# Patient Record
(demographics unavailable — no encounter records)

---

## 2025-01-17 NOTE — ASSESSMENT
[FreeTextEntry1] : abdominal pain, chronic intermittent severe episodes with recent worsening requiring hospitalization follow up imaging with persistent SB abnormalities. -10 cm loop of left mid small bowel with persistent but improved upstream bowel dilatation.?Crohn's disease reviewed diagnostic evaluation including enteroscopy, or VCE or not testing. Due to concern for capsule retention will recommend  enteroscopy.  -referred to Dr. French  -labs /stool studies ordered. May require biologic therapy -continue LF diet -patient counseled to go to ER if worsening pain or other concerns.

## 2025-01-17 NOTE — HISTORY OF PRESENT ILLNESS
[FreeTextEntry1] : 58 yo f PMH skin ca s/p MOHS, ventral hernia repair, saw NP  for bloating/distentionx 2 years.   active lifestyle, balanced diet. daily bm colonoscopy for anemia at age 49 normal.   EGD/Colonoscopy 12/2024- path benign- hyperplastic polyp gastric cardiac- recall EGD in 1 y colon polyp hyperplastic - recall in 3 y  saw surgeon at OSH for abd pain , US reportedly showed sludge, HIDA equivocal.  due to continued attacks for bloating/distention CT A/P ordered, trial of H2b  in the meantime was admitted to [facility] 12/23 CT performed in the hospital showed inflammatio in the small intestine, MRE to be scheduled when improved.   CT A.P 12/23/24 with IVC only: IMPRESSION:  Focally dilated jejunal fold in the left abdomen containing fecal material, concerning for at least partial small bowel obstruction. Transition point identified in the left lower quadrant with diffuse mural wall thickening/hyperemia of distal jejunal loop, enteric infectious or inflammatory process considered.  Surgical consultation and follow-up imaging is suggested.  patient seen by surgery in the hospital - improved clinically.  Patient tolerated yogurt and was discharged. continued to have symptoms while traveling for the holidays. remained on liquid diet and PPI.    she remained symptom free since 12/29 and put back on some weight.   MRE performed 1/13/25: WEL: Persistent but improved circumferential inflammation involving a 10 cm loop of left mid small bowel. There is mesenteric hyperemia. Associated luminal narrowing of the affected bowel loops with persistent mild upstream bowel dilatation, also improved. No additional sites of inflammation in the small or large bowel.  IMPRESSION:  *  Persistent but improved active inflammation involving a 10 cm loop of left mid small bowel with persistent but improved upstream bowel dilatation.  *  No additional sites of inflammation in the small or large bowel.  she has gained a couple of pounds, now 95 lbs, her baseline in 100 bs she is still on low fiber diet she is pain free since 12/29 bm qod. no brbpr/melena    pat gm- CRC dx in her late 60s no other relatives with colon poyps, colon cancer no IBD , no autoimune disease

## 2025-01-17 NOTE — HISTORY OF PRESENT ILLNESS
[FreeTextEntry1] : 60 yo f PMH skin ca s/p MOHS, ventral hernia repair, saw NP  for bloating/distentionx 2 years.   active lifestyle, balanced diet. daily bm colonoscopy for anemia at age 49 normal.   EGD/Colonoscopy 12/2024- path benign- hyperplastic polyp gastric cardiac- recall EGD in 1 y colon polyp hyperplastic - recall in 3 y  saw surgeon at OSH for abd pain , US reportedly showed sludge, HIDA equivocal.  due to continued attacks for bloating/distention CT A/P ordered, trial of H2b  in the meantime was admitted to [facility] 12/23 CT performed in the hospital showed inflammatio in the small intestine, MRE to be scheduled when improved.   CT A.P 12/23/24 with IVC only: IMPRESSION:  Focally dilated jejunal fold in the left abdomen containing fecal material, concerning for at least partial small bowel obstruction. Transition point identified in the left lower quadrant with diffuse mural wall thickening/hyperemia of distal jejunal loop, enteric infectious or inflammatory process considered.  Surgical consultation and follow-up imaging is suggested.  patient seen by surgery in the hospital - improved clinically.  Patient tolerated yogurt and was discharged. continued to have symptoms while traveling for the holidays. remained on liquid diet and PPI.    she remained symptom free since 12/29 and put back on some weight.   MRE performed 1/13/25: WEL: Persistent but improved circumferential inflammation involving a 10 cm loop of left mid small bowel. There is mesenteric hyperemia. Associated luminal narrowing of the affected bowel loops with persistent mild upstream bowel dilatation, also improved. No additional sites of inflammation in the small or large bowel.  IMPRESSION:  *  Persistent but improved active inflammation involving a 10 cm loop of left mid small bowel with persistent but improved upstream bowel dilatation.  *  No additional sites of inflammation in the small or large bowel.  she has gained a couple of pounds, now 95 lbs, her baseline in 100 bs she is still on low fiber diet she is pain free since 12/29 bm qod. no brbpr/melena    pat gm- CRC dx in her late 60s no other relatives with colon poyps, colon cancer no IBD , no autoimune disease

## 2025-02-15 NOTE — HISTORY OF PRESENT ILLNESS
[FreeTextEntry1] : 60 yo f PMH skin ca s/p MOHS, ventral hernia repair, presents for follow up of enteritis.  had recent enteroscopy with Dr. French: Reviewed results of small bowel enteroscopy biopsies confirming "focal severe chronic active inflammation and reactive changes." No ulcers, masses, lesions seen - although mucosa appeared inflamed in a patchy pattern. No strictures seen.  She is having more abd pain this week, not as severe as prior. Still able to tolerate PO. weight is stable. bm normal.  she has gotten shingrix vaccine. she is up to date with other vaccinations.  [place] did not live abroad.  no Tb exposures. no EIM of IBD denies NSAID use.    last seen 1/15/25 active lifestyle, balanced diet. daily bm colonoscopy for anemia at age 49 normal.   EGD/Colonoscopy 12/2024- path benign- hyperplastic polyp gastric cardiac- recall EGD in 1 y colon polyp hyperplastic - recall in 3 y  saw surgeon at OSH for abd pain , US reportedly showed sludge, HIDA equivocal.  due to continued attacks for bloating/distention CT A/P ordered, trial of H2b  in the meantime was admitted to [facility] 12/23 CT performed in the hospital showed inflammatio in the small intestine, MRE to be scheduled when improved.   CT A.P 12/23/24 with IVC only: IMPRESSION:  Focally dilated jejunal fold in the left abdomen containing fecal material, concerning for at least partial small bowel obstruction. Transition point identified in the left lower quadrant with diffuse mural wall thickening/hyperemia of distal jejunal loop, enteric infectious or inflammatory process considered.  Surgical consultation and follow-up imaging is suggested.  patient seen by surgery in the hospital - improved clinically.  Patient tolerated yogurt and was discharged. continued to have symptoms while traveling for the holidays. remained on liquid diet and PPI.    she remained symptom free since 12/29 and put back on some weight.   MRE performed 1/13/25: WEL: Persistent but improved circumferential inflammation involving a 10 cm loop of left mid small bowel. There is mesenteric hyperemia. Associated luminal narrowing of the affected bowel loops with persistent mild upstream bowel dilatation, also improved. No additional sites of inflammation in the small or large bowel.  IMPRESSION:  *  Persistent but improved active inflammation involving a 10 cm loop of left mid small bowel with persistent but improved upstream bowel dilatation.  *  No additional sites of inflammation in the small or large bowel.  she has gained a couple of pounds, now 95 lbs, her baseline in 100 bs she is still on low fiber diet she is pain free since 12/29 bm qod. no brbpr/melena    pat gm- CRC dx in her late 60s no other relatives with colon polyps, colon cancer no IBD , no autoimmune disease

## 2025-02-15 NOTE — HISTORY OF PRESENT ILLNESS
[FreeTextEntry1] : 60 yo f PMH skin ca s/p MOHS, ventral hernia repair, presents for follow up of enteritis.  had recent enteroscopy with Dr. French: Reviewed results of small bowel enteroscopy biopsies confirming "focal severe chronic active inflammation and reactive changes." No ulcers, masses, lesions seen - although mucosa appeared inflamed in a patchy pattern. No strictures seen.  She is having more abd pain this week, not as severe as prior. Still able to tolerate PO. weight is stable. bm normal.  she has gotten shingrix vaccine. she is up to date with other vaccinations.  [place] did not live abroad.  no Tb exposures. no EIM of IBD denies NSAID use.    last seen 1/15/25 active lifestyle, balanced diet. daily bm colonoscopy for anemia at age 49 normal.   EGD/Colonoscopy 12/2024- path benign- hyperplastic polyp gastric cardiac- recall EGD in 1 y colon polyp hyperplastic - recall in 3 y  saw surgeon at OSH for abd pain , US reportedly showed sludge, HIDA equivocal.  due to continued attacks for bloating/distention CT A/P ordered, trial of H2b  in the meantime was admitted to [facility] 12/23 CT performed in the hospital showed inflammatio in the small intestine, MRE to be scheduled when improved.   CT A.P 12/23/24 with IVC only: IMPRESSION:  Focally dilated jejunal fold in the left abdomen containing fecal material, concerning for at least partial small bowel obstruction. Transition point identified in the left lower quadrant with diffuse mural wall thickening/hyperemia of distal jejunal loop, enteric infectious or inflammatory process considered.  Surgical consultation and follow-up imaging is suggested.  patient seen by surgery in the hospital - improved clinically.  Patient tolerated yogurt and was discharged. continued to have symptoms while traveling for the holidays. remained on liquid diet and PPI.    she remained symptom free since 12/29 and put back on some weight.   MRE performed 1/13/25: WEL: Persistent but improved circumferential inflammation involving a 10 cm loop of left mid small bowel. There is mesenteric hyperemia. Associated luminal narrowing of the affected bowel loops with persistent mild upstream bowel dilatation, also improved. No additional sites of inflammation in the small or large bowel.  IMPRESSION:  *  Persistent but improved active inflammation involving a 10 cm loop of left mid small bowel with persistent but improved upstream bowel dilatation.  *  No additional sites of inflammation in the small or large bowel.  she has gained a couple of pounds, now 95 lbs, her baseline in 100 bs she is still on low fiber diet she is pain free since 12/29 bm qod. no brbpr/melena    pat gm- CRC dx in her late 60s no other relatives with colon polyps, colon cancer no IBD , no autoimmune disease

## 2025-02-15 NOTE — ASSESSMENT
[FreeTextEntry1] : abdominal pain, chronic intermittent severe episodes with recent worsening requiring hospitalization due to partial SBO follow up imaging with persistent SB abnormalities. -10 cm loop of left mid small bowel with persistent but improved upstream bowel dilatation. recent enteroscopy consistent with Crohn's disease. stool studies negative for infection. patient denies NSAID use.  counseled patient on features of Crohn's disease, diagnosis, natural history, treatment options, monitoring and prognosis due to evidence of severe disease recommend biologic therapy.  patient is treatment naive and based on currently available effectiveness and safety data recommend rizankizumab  -reviewed risks of treatment including but not limited to infusion/injection reaction, increased risk of infection, drug induced liver injury and antibody development discussed alternative treatments with ustekinumab and vedolizumab,  -patient is up to date with vaccines. up to date with cancer screening-MMG, pap, colonoscopy. she sees dermatology yearly.  follows with PCP for DEXA.  -continue LF diet -discussed the at risk of uncontrolled disease is greater than risk of treatment.  -patient understands and agrees to treatment with Rizankizumab -will start PA process with insurance and notify patient when ready. Will try to avoid steroids if possible.  -patient instructed to go to ER if worsening pain, inability to tolerate PO or other concerns. as may require IV steroids

## 2025-07-13 NOTE — ASSESSMENT
[FreeTextEntry1] : SB Crohns diseasedx 02/2025, hostpialized for pSBO, on Skyrizi,  worsening symptoms since starting Riza injections.  check labs, calpro patient counseled to go to ER if worsening pain or other concerns.  plan for imaging at 6 months of Riza or sooner if needed.   rectal bleeding, likely int hemorrhoids, rectal exam today- normal external exam, normal TANIYA. hard stoo in vaulyt. brown stool. Chaperoned by Abhishek Choi MA -check for Crohn's disease -call if severe bleeding  EGD for gastric polyp due dec 2025  f/u in 08/2025

## 2025-07-13 NOTE — HISTORY OF PRESENT ILLNESS
[FreeTextEntry1] : 60 yo f PMH skin ca s/p MOHS, ventral hernia repair, presents for follow up of SB Crohns disease, on Skyrizi.   bright pink blood on tp last weekend, ?blood on the stool.  occurred one other time this week.  she has been having mild periumbilical pain , typical of her Crohns disease hd 1 st Skyrizi injection June 4. the following week she started to have intermittent discomfort.  most consistent in last 2 weeks.  eating all kinds of food. limiting vegetables. has small salads.  weight is stable.  her baseline pattern for past 2 years- is small stools on daily doses, then larger bm every few days.    prior hx Was on Prednisone,  felt better in 24 hours after starting prednisone. started Skyrizi, March 2025 just finished last wek of Prednisone.  3rd skyrizi due next week.  she stopped her allergy medications.   eating more.  now up to 99 lbs (previously 90 lbs) . baseline is 102 lbs.   prior hx had recent enteroscopy with Dr. French: Reviewed results of small bowel enteroscopy biopsies confirming "focal severe chronic active inflammation and reactive changes." No ulcers, masses, lesions seen - although mucosa appeared inflamed in a patchy pattern. No strictures seen.  She is having more abd pain this week, not as severe as prior. Still able to tolerate PO. weight is stable. bm normal.  she has gotten shingrix vaccine. she is up to date with other vaccinations.  [place] did not live [place].  no Tb exposures. no EIM of IBD denies NSAID use.    last seen 1/15/25 active lifestyle, balanced diet. daily bm colonoscopy for anemia at age 49 normal.   EGD/Colonoscopy 12/2024- path benign- hyperplastic polyp gastric cardiac- recall EGD in 1 y colon polyp hyperplastic - recall in 3 y  saw surgeon at OSH for abd pain , US reportedly showed sludge, HIDA equivocal.  due to continued attacks for bloating/distention CT A/P ordered, trial of H2b  in the meantime was admitted to [facility] 12/23 CT performed in the hospital showed inflammatio in the small intestine, MRE to be scheduled when improved.   CT A.P 12/23/24 with IVC only: IMPRESSION:  Focally dilated jejunal fold in the left abdomen containing fecal material, concerning for at least partial small bowel obstruction. Transition point identified in the left lower quadrant with diffuse mural wall thickening/hyperemia of distal jejunal loop, enteric infectious or inflammatory process considered.  Surgical consultation and follow-up imaging is suggested.  patient seen by surgery in the hospital - improved clinically.  Patient tolerated yogurt and was discharged. continued to have symptoms while traveling for the holidays. remained on liquid diet and PPI.    she remained symptom free since 12/29 and put back on some weight.   MRE performed 1/13/25: WEL: Persistent but improved circumferential inflammation involving a 10 cm loop of left mid small bowel. There is mesenteric hyperemia. Associated luminal narrowing of the affected bowel loops with persistent mild upstream bowel dilatation, also improved. No additional sites of inflammation in the small or large bowel.  IMPRESSION:  *  Persistent but improved active inflammation involving a 10 cm loop of left mid small bowel with persistent but improved upstream bowel dilatation.  *  No additional sites of inflammation in the small or large bowel.  she has gained a couple of pounds, now 95 lbs, her baseline in 100 bs she is still on low fiber diet she is pain free since 12/29 bm qod. no brbpr/melena    pat gm- CRC dx in her late 60s no other relatives with colon polyps, colon cancer no IBD , no autoimmune disease

## 2025-07-13 NOTE — ADDENDUM
[FreeTextEntry1] : spoke with patient - Stool test +for EPEC- will treat with Cipro x 3 days. F/u in 1 week.

## 2025-07-13 NOTE — HISTORY OF PRESENT ILLNESS
[FreeTextEntry1] : 60 yo f PMH skin ca s/p MOHS, ventral hernia repair, presents for follow up of SB Crohns disease, on Skyrizi.   bright pink blood on tp last weekend, ?blood on the stool.  occurred one other time this week.  she has been having mild periumbilical pain , typical of her Crohns disease hd 1 st Skyrizi injection June 4. the following week she started to have intermittent discomfort.  most consistent in last 2 weeks.  eating all kinds of food. limiting vegetables. has small salads.  weight is stable.  her baseline pattern for past 2 years- is small stools on daily doses, then larger bm every few days.    prior hx Was on Prednisone,  felt better in 24 hours after starting prednisone. started Skyrizi, March 2025 just finished last wek of Prednisone.  3rd skyrizi due next week.  she stopped her allergy medications.   eating more.  now up to 99 lbs (previously 90 lbs) . baseline is 102 lbs.   prior hx had recent enteroscopy with Dr. French: Reviewed results of small bowel enteroscopy biopsies confirming "focal severe chronic active inflammation and reactive changes." No ulcers, masses, lesions seen - although mucosa appeared inflamed in a patchy pattern. No strictures seen.  She is having more abd pain this week, not as severe as prior. Still able to tolerate PO. weight is stable. bm normal.  she has gotten shingrix vaccine. she is up to date with other vaccinations.  [place].  no Tb exposures. no EIM of IBD denies NSAID use.    last seen 1/15/25 active lifestyle, balanced diet. daily bm colonoscopy for anemia at age 49 normal.   EGD/Colonoscopy 12/2024- path benign- hyperplastic polyp gastric cardiac- recall EGD in 1 y colon polyp hyperplastic - recall in 3 y  saw surgeon at OSH for abd pain , US reportedly showed sludge, HIDA equivocal.  due to continued attacks for bloating/distention CT A/P ordered, trial of H2b  in the meantime was admitted to [facility] 12/23 CT performed in the hospital showed inflammatio in the small intestine, MRE to be scheduled when improved.   CT A.P 12/23/24 with IVC only: IMPRESSION:  Focally dilated jejunal fold in the left abdomen containing fecal material, concerning for at least partial small bowel obstruction. Transition point identified in the left lower quadrant with diffuse mural wall thickening/hyperemia of distal jejunal loop, enteric infectious or inflammatory process considered.  Surgical consultation and follow-up imaging is suggested.  patient seen by surgery in the hospital - improved clinically.  Patient tolerated yogurt and was discharged. continued to have symptoms while traveling for the holidays. remained on liquid diet and PPI.    she remained symptom free since 12/29 and put back on some weight.   MRE performed 1/13/25: WEL: Persistent but improved circumferential inflammation involving a 10 cm loop of left mid small bowel. There is mesenteric hyperemia. Associated luminal narrowing of the affected bowel loops with persistent mild upstream bowel dilatation, also improved. No additional sites of inflammation in the small or large bowel.  IMPRESSION:  *  Persistent but improved active inflammation involving a 10 cm loop of left mid small bowel with persistent but improved upstream bowel dilatation.  *  No additional sites of inflammation in the small or large bowel.  she has gained a couple of pounds, now 95 lbs, her baseline in 100 bs she is still on low fiber diet she is pain free since 12/29 bm qod. no brbpr/melena    pat gm- CRC dx in her late 60s no other relatives with colon polyps, colon cancer no IBD , no autoimmune disease

## 2025-07-16 NOTE — PHYSICAL EXAM
[Appropriately responsive] : appropriately responsive [Alert] : alert [No Acute Distress] : no acute distress [No Lymphadenopathy] : no lymphadenopathy [Soft] : soft [Non-tender] : non-tender [Non-distended] : non-distended [No HSM] : No HSM [No Mass] : no mass [Oriented x3] : oriented x3 [Examination Of The Breasts] : a normal appearance [No Discharge] : no discharge [No Masses] : no breast masses were palpable [No Lesions] : no lesions  [Labia Majora] : normal [Labia Minora] : normal [Atrophy] : atrophy [No Bleeding] : There was no active vaginal bleeding [Normal] : normal [Anteversion] : anteverted [Uterine Adnexae] : normal [Chaperone Declined] : Chaperone offered however refused by patient, [Tenderness] : nontender [Enlarged ___ wks] : not enlarged [FreeTextEntry5] : no CMT [FreeTextEntry9] : deferred [FreeTextEntry8] : no pelvic masses or tenderness

## 2025-07-16 NOTE — HISTORY OF PRESENT ILLNESS
[FreeTextEntry1] : 58 y/o  (one adopted daughter) presents for annual GYN exam.   from  and lives on her own. Not currently sexually active.  Using Vagifem tabs for vaginal atrophy with excellent relief.  Postmenopausal since her 40's without HRT.  Denies any PMB.  Breast biopsies in past - all benign.  Has been diagnosed with Crohn's and is doing well on medication regime.  PGM- colon ca  Denies FH of ca of ovary, uterus or breast.  Lives alone.  Daughter who is on the spectrum who graduated last year from [employer] is alternatively staying with both parents.  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 [Mammogramdate] : 8/6/2024 [TextBox_19] : BI-RADS 1 D [BreastSonogramDate] : 8/6/2024 [TextBox_25] : BI-RADS 1 [PapSmeardate] : 1/3/2022 [TextBox_31] : NILM/HPV- [BoneDensityDate] : 7/31/2023 [TextBox_37] : Osteopenia [ColonoscopyDate] : 12/9/2024 [TextBox_43] : Inzdm-0-mgle recall

## 2025-07-16 NOTE — REVIEW OF SYSTEMS
[Negative] : Heme/Lymph [Constipation] : no constipation [Bloating] : no bloating [Nausea] : no nausea [Abn Vaginal bleeding] : no abnormal vaginal bleeding [Pelvic pain] : no pelvic pain